# Patient Record
Sex: MALE
[De-identification: names, ages, dates, MRNs, and addresses within clinical notes are randomized per-mention and may not be internally consistent; named-entity substitution may affect disease eponyms.]

---

## 2018-07-30 ENCOUNTER — HOSPITAL ENCOUNTER (EMERGENCY)
Dept: HOSPITAL 14 - H.ER | Age: 83
Discharge: HOME | End: 2018-07-30
Payer: MEDICAID

## 2018-07-30 VITALS
TEMPERATURE: 98.6 F | OXYGEN SATURATION: 98 % | HEART RATE: 62 BPM | DIASTOLIC BLOOD PRESSURE: 82 MMHG | RESPIRATION RATE: 15 BRPM | SYSTOLIC BLOOD PRESSURE: 132 MMHG

## 2018-07-30 DIAGNOSIS — E03.9: ICD-10-CM

## 2018-07-30 DIAGNOSIS — R30.0: ICD-10-CM

## 2018-07-30 DIAGNOSIS — K57.30: ICD-10-CM

## 2018-07-30 DIAGNOSIS — I10: ICD-10-CM

## 2018-07-30 DIAGNOSIS — R10.9: Primary | ICD-10-CM

## 2018-07-30 LAB
ALBUMIN SERPL-MCNC: 4.1 G/DL (ref 3.5–5)
ALBUMIN/GLOB SERPL: 1.4 {RATIO} (ref 1–2.1)
ALT SERPL-CCNC: 14 U/L (ref 21–72)
AST SERPL-CCNC: 16 U/L (ref 17–59)
BASOPHILS # BLD AUTO: 0 K/UL (ref 0–0.2)
BASOPHILS NFR BLD: 0.4 % (ref 0–2)
BILIRUB UR-MCNC: NEGATIVE MG/DL
BUN SERPL-MCNC: 24 MG/DL (ref 9–20)
CALCIUM SERPL-MCNC: 9.5 MG/DL (ref 8.4–10.2)
COLOR UR: (no result)
EOSINOPHIL # BLD AUTO: 0.1 K/UL (ref 0–0.7)
EOSINOPHIL NFR BLD: 1 % (ref 0–4)
ERYTHROCYTE [DISTWIDTH] IN BLOOD BY AUTOMATED COUNT: 15.1 % (ref 11.5–14.5)
GFR NON-AFRICAN AMERICAN: 45
GLUCOSE UR STRIP-MCNC: (no result) MG/DL
HGB BLD-MCNC: 13.4 G/DL (ref 12–18)
LEUKOCYTE ESTERASE UR-ACNC: (no result) LEU/UL
LIPASE SERPL-CCNC: 273 U/L (ref 23–300)
LYMPHOCYTES # BLD AUTO: 1.2 K/UL (ref 1–4.3)
LYMPHOCYTES NFR BLD AUTO: 18.9 % (ref 20–40)
MCH RBC QN AUTO: 27.8 PG (ref 27–31)
MCHC RBC AUTO-ENTMCNC: 33.6 G/DL (ref 33–37)
MCV RBC AUTO: 82.6 FL (ref 80–94)
MONOCYTES # BLD: 0.5 K/UL (ref 0–0.8)
MONOCYTES NFR BLD: 8.7 % (ref 0–10)
NEUTROPHILS # BLD: 4.4 K/UL (ref 1.8–7)
NEUTROPHILS NFR BLD AUTO: 71 % (ref 50–75)
NRBC BLD AUTO-RTO: 0 % (ref 0–0)
PH UR STRIP: 6 [PH] (ref 5–8)
PLATELET # BLD: 204 K/UL (ref 130–400)
PMV BLD AUTO: 7 FL (ref 7.2–11.7)
PROT UR STRIP-MCNC: NEGATIVE MG/DL
RBC # BLD AUTO: 4.84 MIL/UL (ref 4.4–5.9)
RBC # UR STRIP: (no result) /UL
SP GR UR STRIP: 1.01 (ref 1–1.03)
URINE CLARITY: CLEAR
UROBILINOGEN UR-MCNC: (no result) MG/DL (ref 0.2–1)
WBC # BLD AUTO: 6.3 K/UL (ref 4.8–10.8)

## 2018-07-30 NOTE — CT
Date of service: 



07/30/2018



PROCEDURE:  CT Abdomen and Pelvis without intravenous contrast



HISTORY:

right flank pain/dysuria



COMPARISON:

None.



TECHNIQUE:

Helical CT of the abdomen and pelvis was performed without oral or 

intravenous contrast as per referring physician requestHelical CT of 

the abdomen and pelvis was performed without oral or intravenous 

contrast as per referring physician request



Contrast dose: None



Radiation dose:



Total exam DLP = 691.97 mGy-cm.



This CT exam was performed using one or more of the following dose 

reduction techniques: Automated exposure control, adjustment of the 

mA and/or kV according to patient size, and/or use of iterative 

reconstruction technique.



FINDINGS:



LOWER THORAX:

There is marked right hemidiaphragm elevation with associated 

compressive atelectasis at the right lower lobe.  Small calcified 

granuloma seen at the left lower lobe at the medial base.  No pleural 

or pericardial effusion.  



LIVER:

Unremarkable. No gross lesion or ductal dilatation.  



GALLBLADDER AND BILE DUCTS:

Unremarkable. 



PANCREAS:

Unremarkable. No gross lesion or ductal dilatation.



SPLEEN:

Unremarkable. 



ADRENALS:

Unremarkable. No mass. 



KIDNEYS AND URETERS:

There multiple phleboliths identified inferior pelvis however there 

is no obstructive uropathy bilaterally or ureteral radiodense calculi 

either. No definitive intrarenal calculi identified bilaterally. 

Multiple bilateral renal cysts are identified with the dominant 

measuring 3.1 cm greatest dimension and the dominant left renal cyst 

identified at the lower pole measuring 2.7 cm greatest dimension. For 

additional bilateral renal lucencies too small to characterize. The 

lack of intravenous contrast limits interpretation. 



VASCULATURE:

Unremarkable. No aortic aneurysm. 



BOWEL:

Collapsed stomach. No bowel obstruction evident.  Moderate fecal 

loading seen throughout the colon.  Left colonic diverticulosis 

appears nonacute but is concentrated at the sigmoid segment. Due to 

elevate right hemidiaphragm, the hepatic flexure is interposed 

between the hemidiaphragm in the right lobe liver.



APPENDIX:

Unremarkable. Normal appendix. 



PERITONEUM:

Unremarkable. No free fluid. No free air. 



LYMPH NODES:

Unremarkable. No enlarged lymph nodes. 



BLADDER:

Urinary bladder is decompressed and appears unremarkable as imaged.  

Thickness of the wall is difficult to evaluate due to decompression. 



REPRODUCTIVE:

Enlarged prostate gland. 



BONES:

Multiple old healed left rib fractures identified inferiorly. 



OTHER FINDINGS:

None.



IMPRESSION:

1. No obstructive uropathy identified bilaterally or radiodense 

urolithiasis.  Urinary bladder is decompressed with limited 

evaluation of the the wall appreciated. Bilateral renal lucencies are 

identified with some too small to characterize.  Dominant bilateral 

lucencies reflect cysts. No perinephric reaction bilaterally. 



2. Markedly elevated right hemidiaphragm with hepatic flexure 

interposed between the right hemidiaphragm and the liver. Extensive 

left colonic diverticulosis without definite diverticulitis 

appreciated.

## 2018-07-30 NOTE — ED PDOC
HPI: Male  Pain


Time Seen by Provider: 07/30/18 12:11


Chief Complaint (Nursing): Male Genitourinary


Chief Complaint (Provider): Male Genitourinary


History Per: Patient


History/Exam Limitations: no limitations


Onset/Duration Of Symptoms: Days


Current Symptoms Are (Timing): Still Present


Quality Of Discomfort: Burning (urination)


Additional History Per: Family (daughter)


Additional Complaint(s): 


83 y/o male With a PMHx of thyroid problems and HTN and accompanied by daughter 

presents to the ED complaining of dysuria, onset last night. Patient states he 

is currently pain free but notes pain in the back and lower abdomen with 

urination. On July 10th, patient was admitted to a hospital in [place] for 

bradycardia and urinary retention. Patient reports of taking half a dose of 

ciprofloxacin but felt worse. Patient also reports of having a tactile fever 

yesterday and did not check temperature. 





PMD: In [place]. 








Past Medical History


Reviewed: Historical Data, Nursing Documentation, Vital Signs


Vital Signs: 





 Last Vital Signs











Temp  98.0 F   07/30/18 11:10


 


Pulse  56 L  07/30/18 11:10


 


Resp  20   07/30/18 11:10


 


BP  158/87 H  07/30/18 11:10


 


Pulse Ox  95   07/30/18 11:10














- Medical History


PMH: HTN, Hypothyroidism





- Surgical History


Surgical History: No Surg Hx





- Family History


Family History: States: Unknown Family Hx





- Home Medications


Home Medications: 


 Ambulatory Orders











 Medication  Instructions  Recorded


 


Phenazopyridine HCl [Pyridium] 200 mg PO Q12 PRN #3 tablet 07/30/18














- Allergies


Allergies/Adverse Reactions: 


 Allergies











Allergy/AdvReac Type Severity Reaction Status Date / Time


 


No Known Allergies Allergy   Verified 07/30/18 12:18














Review of Systems


ROS Statement: Except As Marked, All Systems Reviewed And Found Negative


Gastrointestinal: Positive for: Abdominal Pain (lower)


Genitourinary Male: Positive for: Dysuria


Musculoskeletal: Positive for: Back Pain (low)





Physical Exam





- Reviewed


Nursing Documentation Reviewed: Yes


Vital Signs Reviewed: Yes





- Physical Exam


Appears: Positive for: No Acute Distress


Head Exam: Positive for: ATRAUMATIC


Skin: Positive for: Normal Color


Eye Exam: Positive for: Normal appearance


Neck: Positive for: Normal


Cardiovascular/Chest: Positive for: Regular Rate, Rhythm.  Negative for: Murmur


Respiratory: Positive for: Normal Breath Sounds.  Negative for: Respiratory 

Distress


Gastrointestinal/Abdominal: Positive for: Normal Exam, Soft.  Negative for: 

Tenderness


Back: Positive for: Normal Inspection.  Negative for: L CVA Tenderness, R CVA 

Tenderness


Extremity: Positive for: Normal ROM.  Negative for: Deformity


Neurologic/Psych: Positive for: Alert, Oriented.  Negative for: Motor/Sensory 

Deficits





- Laboratory Results


Result Diagrams: 


 07/30/18 12:30





 07/30/18 12:30





- ECG


O2 Sat by Pulse Oximetry: 95 (RA)


Pulse Ox Interpretation: Normal





Medical Decision Making


Medical Decision Making: 


Time: 1230


Plan:


-- CT Abd/Pelvis w/o PO or IV contrast


-- CMP 


-- Lipase


-- CBC with differentials


-- Urine Culture


-- Urinalysis 





Time: 1423


CT ABD/PELVIS RESULTS


FINDINGS:





LOWER THORAX:


There is marked right hemidiaphragm elevation with associated compressive 

atelectasis at the right lower lobe.  Small calcified granuloma seen at the 

left lower lobe at the medial base.  No pleural or pericardial effusion.  





LIVER:


Unremarkable. No gross lesion or ductal dilatation.  





GALLBLADDER AND BILE DUCTS:


Unremarkable. 





PANCREAS:


Unremarkable. No gross lesion or ductal dilatation.





SPLEEN:


Unremarkable. 





ADRENALS:


Unremarkable. No mass. 





KIDNEYS AND URETERS:


There multiple phleboliths identified inferior pelvis however there is no 

obstructive uropathy bilaterally or ureteral radiodense calculi either. No 

definitive intrarenal calculi identified bilaterally. Multiple bilateral renal 

cysts are identified with the dominant measuring 3.1 cm greatest dimension and 

the dominant left renal cyst identified at the lower pole measuring 2.7 cm 

greatest dimension. For additional bilateral renal lucencies too small to 

characterize. The lack of intravenous contrast limits interpretation. 





VASCULATURE:


Unremarkable. No aortic aneurysm. 





BOWEL:


Collapsed stomach. No bowel obstruction evident.  Moderate fecal loading seen 

throughout the colon.  Left colonic diverticulosis appears nonacute but is 

concentrated at the sigmoid segment. Due to elevate right hemidiaphragm, the 

hepatic flexure is interposed between the hemidiaphragm in the right lobe liver.





APPENDIX:


Unremarkable. Normal appendix. 





PERITONEUM:


Unremarkable. No free fluid. No free air. 





LYMPH NODES:


Unremarkable. No enlarged lymph nodes. 





BLADDER:


Urinary bladder is decompressed and appears unremarkable as imaged.  Thickness 

of the wall is difficult to evaluate due to decompression. 





REPRODUCTIVE:


Enlarged prostate gland. 





BONES:


Multiple old healed left rib fractures identified inferiorly. 





OTHER FINDINGS:


None.





IMPRESSION:


1. No obstructive uropathy identified bilaterally or radiodense urolithiasis.  

Urinary bladder is decompressed with limited evaluation of the the wall 

appreciated. Bilateral renal lucencies are identified with some too small to 

characterize.  Dominant bilateral lucencies reflect cysts. No perinephric 

reaction bilaterally. 





2. Markedly elevated right hemidiaphragm with hepatic flexure interposed 

between the right hemidiaphragm and the liver. Extensive left colonic 

diverticulosis without definite diverticulitis appreciated.











____________________________________________________________________________


Scribe Attestation:


Documented by Jacquie Bass, acting as a scribe for Leighton Delgado PA-C. 





Provider Scribe Attestation:


All medical record entries made by the Scribe were at my direction and 

personally dictated by me. I have reviewed the chart and agree that the record 

accurately reflects my personal performance of the history, physical exam, 

medical decision making, and the department course for this patient. I have 

also personally directed, reviewed, and agree with the discharge instructions 

and disposition.





Disposition





- Clinical Impression


Clinical Impression: 


 Dysuria








- Patient ED Disposition


Is Patient to be Admitted: No





- Disposition


Referrals: 


Raul Sorenson MD [Staff Provider] - 


Disposition: Routine/Home


Disposition Time: 15:04


Condition: FAIR


Prescriptions: 


Phenazopyridine HCl [Pyridium] 200 mg PO Q12 PRN #3 tablet


 PRN Reason: Urinary Discomt


Instructions:  Dysuria, Adult (DC)


Print Language: [place]